# Patient Record
Sex: FEMALE | Race: AMERICAN INDIAN OR ALASKA NATIVE | NOT HISPANIC OR LATINO | Employment: OTHER | URBAN - NONMETROPOLITAN AREA
[De-identification: names, ages, dates, MRNs, and addresses within clinical notes are randomized per-mention and may not be internally consistent; named-entity substitution may affect disease eponyms.]

---

## 2022-05-21 ENCOUNTER — OFFICE VISIT (OUTPATIENT)
Dept: URGENT CARE | Facility: PHYSICIAN GROUP | Age: 67
End: 2022-05-21
Payer: COMMERCIAL

## 2022-05-21 VITALS
BODY MASS INDEX: 36.12 KG/M2 | RESPIRATION RATE: 14 BRPM | TEMPERATURE: 97.9 F | OXYGEN SATURATION: 98 % | DIASTOLIC BLOOD PRESSURE: 80 MMHG | SYSTOLIC BLOOD PRESSURE: 132 MMHG | HEIGHT: 60 IN | HEART RATE: 68 BPM | WEIGHT: 184 LBS

## 2022-05-21 DIAGNOSIS — N93.9 VAGINAL BLEEDING: ICD-10-CM

## 2022-05-21 DIAGNOSIS — I10 HYPERTENSION, UNSPECIFIED TYPE: ICD-10-CM

## 2022-05-21 LAB
APPEARANCE UR: CLEAR
BILIRUB UR STRIP-MCNC: NORMAL MG/DL
COLOR UR AUTO: YELLOW
GLUCOSE UR STRIP.AUTO-MCNC: NORMAL MG/DL
KETONES UR STRIP.AUTO-MCNC: NORMAL MG/DL
LEUKOCYTE ESTERASE UR QL STRIP.AUTO: NORMAL
NITRITE UR QL STRIP.AUTO: NORMAL
PH UR STRIP.AUTO: 6 [PH] (ref 5–8)
PROT UR QL STRIP: NORMAL MG/DL
RBC UR QL AUTO: NORMAL
SP GR UR STRIP.AUTO: 1.01
UROBILINOGEN UR STRIP-MCNC: 0.2 MG/DL

## 2022-05-21 PROCEDURE — 81002 URINALYSIS NONAUTO W/O SCOPE: CPT | Performed by: FAMILY MEDICINE

## 2022-05-21 PROCEDURE — 99203 OFFICE O/P NEW LOW 30 MIN: CPT | Performed by: FAMILY MEDICINE

## 2022-05-21 RX ORDER — AMLODIPINE BESYLATE 5 MG/1
5 TABLET ORAL DAILY
COMMUNITY
Start: 2022-05-05

## 2022-05-21 NOTE — PROGRESS NOTES
Chief Complaint:    Chief Complaint   Patient presents with   • Hypertension       History of Present Illness:    Concern for high blood pressure. Has HTN, on Amlodipine x 2 weeks. Home BP was 180/97 yesterday and 180/94 today. However MA BP check today was 132/80 and this was also rechecked by NP here today and got similar numbers as MA. She currently feels fine, reports earlier she felt like heart was pounding, she meditated, and went to sleep and does not have this symptom now. Also today, she saw some mild blood when going to bathroom, happened twice today, but she did not notice blood when she wiped when giving urine sample in clinic. She denies any urine symptoms and thinks the blood was from vagina. She lives in [place] and she is leaving today. She has a GYN in [place].      Past Medical History:    No past medical history on file.    Past Surgical History:    No past surgical history on file.    Social History:    Social History     Socioeconomic History   • Marital status:      Spouse name: Not on file   • Number of children: Not on file   • Years of education: Not on file   • Highest education level: Not on file   Occupational History   • Not on file   Tobacco Use   • Smoking status: Unknown If Ever Smoked   • Smokeless tobacco: Never Used   Substance and Sexual Activity   • Alcohol use: Not Currently   • Drug use: Not Currently   • Sexual activity: Not Currently   Other Topics Concern   • Not on file   Social History Narrative   • Not on file     Social Determinants of Health     Financial Resource Strain: Not on file   Food Insecurity: Not on file   Transportation Needs: Not on file   Physical Activity: Not on file   Stress: Not on file   Social Connections: Not on file   Intimate Partner Violence: Not on file   Housing Stability: Not on file     Family History:    No family history on file.    Medications:    Current Outpatient Medications on File Prior to Visit   Medication Sig Dispense  Refill   • amLODIPine (NORVASC) 5 MG Tab Take 5 mg by mouth every day.       No current facility-administered medications on file prior to visit.     Allergies:    No Known Allergies      Vitals:    Vitals:    05/21/22 1015   BP: 132/80   BP Location: Right arm   Patient Position: Sitting   BP Cuff Size: Large adult   Pulse: 68   Resp: 14   Temp: 36.6 °C (97.9 °F)   TempSrc: Temporal   SpO2: 98%   Weight: 83.5 kg (184 lb)   Height: 1.524 m (5')       Physical Exam:    Constitutional: Vital signs reviewed. Appears well-developed and well-nourished. No acute distress.   Eyes: Sclera white, conjunctivae clear.   ENT: External ears normal. Hearing normal.   Neck: Neck supple.   Cardiovascular: Regular rate and rhythm. No murmur. No edema lower legs.  Pulmonary/Chest: Respirations non-labored. Clear to auscultation bilaterally.  Musculoskeletal: Normal gait. No muscular atrophy or weakness.  Neurological: Alert and oriented to person, place, and time. Muscle tone normal. Coordination normal.   Skin: No rashes or lesions. Warm, dry, normal turgor.  Psychiatric: Normal mood and affect. Behavior is normal. Judgment and thought content normal.       Diagnostics:    POCT Urinalysis  Order: [id number]   Status: Final result     Visible to patient: No (scheduled for 5/22/2022  9:55 AM)     Next appt: None     Dx: Vaginal bleeding     0 Result Notes    Component Ref Range & Units 11:54 AM    POC Color Negative yellow    POC Appearance Negative clear    POC Leukocyte Esterase Negative neg    POC Nitrites Negative neg    POC Urobiligen Negative (0.2) mg/dL 0.2    POC Protein Negative mg/dL neg    POC Urine PH 5.0 - 8.0 6    POC Blood Negative small    POC Specific Gravity <1.005 - >1.030 1.010    POC Ketones Negative mg/dL neg    POC Bilirubin Negative mg/dL neg    POC Glucose Negative mg/dL neg    Resulting [facility]              Specimen Collected: 05/21/22 11:54 AM Last Resulted: 05/21/22 11:54 AM             Medical  Decision Makin. Hypertension, unspecified type    2. Vaginal bleeding  - POCT Urinalysis      Discussed with her DDX, management options, and risks, benefits, and alternatives to treatment plan agreed upon.    Concern for high blood pressure. Has HTN, on Amlodipine x 2 weeks. Home BP was 180/97 yesterday and 180/94 today. However MA BP check today was 132/80 and this was also rechecked by NP here today and got similar numbers as MA. She currently feels fine, reports earlier she felt like heart was pounding, she meditated, and went to sleep and does not have this symptom now. Also today, she saw some mild blood when going to bathroom, happened twice today, but she did not notice blood when she wiped when giving urine sample in clinic. She denies any urine symptoms and thinks the blood was from vagina. She lives in [place] and she is leaving today. She has a GYN in [place].    Her BP x 2 checks here was around 130/80, she was advised of this, and will make no changes in her BP medication at this time. Advised to follow-up with her PCP.    ? etiology of mild vaginal bleeding.    Urine dipstick shows small blood, otherwise normal. She denies UTI symptoms otherwise. She feels the blood she noticed this AM was likely from her vagina. I advised she should follow-up with her GYN in [place] regarding this as she might need endometrial biopsy.    Advised if anything worsens or any immediate concerns, she should be seen by someone for evaluation (she is leaving here today).    She will return to urgent care if needed.